# Patient Record
Sex: MALE | Race: WHITE | URBAN - METROPOLITAN AREA
[De-identification: names, ages, dates, MRNs, and addresses within clinical notes are randomized per-mention and may not be internally consistent; named-entity substitution may affect disease eponyms.]

---

## 2017-05-08 ENCOUNTER — OFFICE VISIT (OUTPATIENT)
Dept: FAMILY MEDICINE | Facility: CLINIC | Age: 32
End: 2017-05-08
Payer: COMMERCIAL

## 2017-05-08 VITALS
OXYGEN SATURATION: 98 % | HEART RATE: 69 BPM | SYSTOLIC BLOOD PRESSURE: 128 MMHG | WEIGHT: 171.6 LBS | TEMPERATURE: 98 F | DIASTOLIC BLOOD PRESSURE: 77 MMHG

## 2017-05-08 DIAGNOSIS — S29.019A STRAIN OF THORACIC SPINE, INITIAL ENCOUNTER: Primary | ICD-10-CM

## 2017-05-08 PROCEDURE — 99213 OFFICE O/P EST LOW 20 MIN: CPT | Performed by: FAMILY MEDICINE

## 2017-05-08 RX ORDER — CYCLOBENZAPRINE HCL 10 MG
5-10 TABLET ORAL 3 TIMES DAILY PRN
Qty: 30 TABLET | Refills: 1 | Status: SHIPPED | OUTPATIENT
Start: 2017-05-08

## 2017-05-08 RX ORDER — CETIRIZINE HYDROCHLORIDE 10 MG/1
10 TABLET ORAL DAILY
COMMUNITY

## 2017-05-08 RX ORDER — LIDOCAINE 50 MG/G
PATCH TOPICAL
Qty: 10 PATCH | Refills: 1 | Status: SHIPPED | OUTPATIENT
Start: 2017-05-08 | End: 2017-05-08

## 2017-05-08 NOTE — NURSING NOTE
Chief Complaint   Patient presents with     Back Pain     started this morning, worse with lifing something heavy, difficulty with breathing and moving due to pain, left lower shoulder blade, upper back,       Initial /77  Pulse 69  Temp 98  F (36.7  C) (Oral)  Wt 171 lb 9.6 oz (77.8 kg)  SpO2 98% There is no height or weight on file to calculate BMI.  Medication Reconciliation: complete  Ronnie Rodriguez CMA

## 2017-05-08 NOTE — MR AVS SNAPSHOT
"              After Visit Summary   2017    Hair Vaca    MRN: [id number]           Patient Information     Date Of Birth          1985        Visit Information        Provider Department      2017 3:00 PM Dylan Quintana MD [facility]        Today's Diagnoses     Strain of thoracic spine, initial encounter    -  1       Follow-ups after your visit        Who to contact     If you have questions or need follow up information about today's clinic visit or your schedule please contact [facility] directly at [phone].  Normal or non-critical lab and imaging results will be communicated to you by Olocodehart, letter or phone within 4 business days after the clinic has received the results. If you do not hear from us within 7 days, please contact the clinic through Olocodehart or phone. If you have a critical or abnormal lab result, we will notify you by phone as soon as possible.  Submit refill requests through WhereverTV or call your pharmacy and they will forward the refill request to us. Please allow 3 business days for your refill to be completed.          Additional Information About Your Visit        MyChart Information     WhereverTV lets you send messages to your doctor, view your test results, renew your prescriptions, schedule appointments and more. To sign up, go to [email or web address] . Click on \"Log in\" on the left side of the screen, which will take you to the Welcome page. Then click on \"Sign up Now\" on the right side of the page.     You will be asked to enter the access code listed below, as well as some personal information. Please follow the directions to create your username and password.     Your access code is: [email or web address]  Expires: 2017  3:59 PM     Your access code will  in 90 days. If you need help or a new code, please call your [facility] or [phone].        Care EveryWhere ID     This is your Care EveryWhere ID. This could be " used by other organizations to access your [facility] medical records  [id number]        Your Vitals Were     Pulse Temperature Pulse Oximetry             69 98  F (36.7  C) (Oral) 98%          Blood Pressure from Last 3 Encounters:   05/08/17 128/77    Weight from Last 3 Encounters:   05/08/17 171 lb 9.6 oz (77.8 kg)              Today, you had the following     No orders found for display         Today's Medication Changes          These changes are accurate as of: 5/8/17  3:59 PM.  If you have any questions, ask your nurse or doctor.               Start taking these medicines.        Dose/Directions    cyclobenzaprine 10 MG tablet   Commonly known as:  FLEXERIL   Used for:  Strain of thoracic spine, initial encounter   Started by:  Dylan Quintana MD        Dose:  5-10 mg   Take 0.5-1 tablets (5-10 mg) by mouth 3 times daily as needed for muscle spasms   Quantity:  30 tablet   Refills:  1            Where to get your medicines      These medications were sent to [facility] [address] [facility] [address]  [id number] [place] [id number]     Phone:  [phone]     cyclobenzaprine 10 MG tablet                Primary Care Provider Office Phone # Fax #    Pia Villeda [phone]       [facility] [address]    [facility] [address]        Thank you!     Thank you for choosing [facility]  for your care. Our goal is always to provide you with excellent care. Hearing back from our patients is one way we can continue to improve our services. Please take a few minutes to complete the written survey that you may receive in the mail after your visit with us. Thank you!             Your Updated Medication List - Protect others around you: Learn how to safely use, store and throw away your medicines at www.disposemymeds.org.          This list is accurate as of: 5/8/17  3:59 PM.  Always use your most recent med  list.                   Brand Name Dispense Instructions for use    cetirizine 10 MG tablet    zyrTEC     Take 10 mg by mouth daily       cyclobenzaprine 10 MG tablet    FLEXERIL    30 tablet    Take 0.5-1 tablets (5-10 mg) by mouth 3 times daily as needed for muscle spasms

## 2017-05-08 NOTE — PROGRESS NOTES
SUBJECTIVE:    Hair Vaca is a 31 year old male seen in clinic to day for low back pain.   Has been going on for  today      History of Present Illness:    There is  history of injury.patient was moving furniture and felt a pop in his mid thoracic back   The pain is located on both sides.   Pain Quality: Sharp   The back pain does not radiate.   Measures which improve the pain include sitting.   Measures which worsen the pain include standing   Associated Signs and Symptoms: tingling of hands and feet     There is no history of abdominal pain or bowel or bladder dysfunction.   Past Medical, social, family histories, medications, and allergies reviewed and updated    OBJECTIVE:  EXAM:   Blood pressure 128/77, pulse 69, temperature 98  F (36.7  C), temperature source Oral, weight 171 lb 9.6 oz (77.8 kg), SpO2 98 %.  General appearance - healthy, alert and no distress  Heart: no murmurs,Regular rate and rhythm.   Chest: is clear; no wheezes or rales.  Abd:The abdomen is soft without tenderness, guarding, mass, rebound or organomegaly. Bowel sounds are present  Back: Tenderness mid thoracic area  EXT: Lower extremities 5/5 strength bilaterally  NEURO: Neurovascularly Intact Distally.   DTRs normal and symmetric throughout L.E  Neg straight leg-raise lulu    ICD-10-CM    1. Strain of thoracic spine, initial encounter S29.019A cyclobenzaprine (FLEXERIL) 10 MG tablet     DISCONTINUED: lidocaine (LIDODERM) 5 % Patch    PLAN:follow up at chiropractor

## 2017-05-17 ENCOUNTER — TELEPHONE (OUTPATIENT)
Dept: FAMILY MEDICINE | Facility: CLINIC | Age: 32
End: 2017-05-17

## 2017-05-17 NOTE — TELEPHONE ENCOUNTER
"Patient was seen 5/8/17 with onset low back pain by Dr. Dylan Quintana  He was put on flexeril (a muscle relaxant).  He's been going to chiropractor every day since last Tuesday.  Has had xrays and will be getting an MRI (through the chiropractor)  He states has now reported it to his work and will be work comp.  Pain is better from aspect that he's \"not paralyzed on the ground like a little girl\" but still \"hurts really bad\".  States pain is more so in between his shoulder blades.  Says xray looks like an \"s\" with \"4 gaps in between his vertebrae.  He had an adjustment with chiropractor this morning and the pain is persistently getting worse.  He states no bowel or bladder issues.  No foot drop.  Advised will need to be seen; ? If his work had instructed him to see their occupational health clinician.  He states had not.  Says would normally just see his regular doctor who's at [facility] but had come to us because we were closer and he was in a lot of pain.  I advised that he contact his [facility] clinic and schedule an appointment with them.  [facility] does have occupational health services as well.  He is questioning if should just go to ED if can't get pain med over the phone.  I told him that if pain wasn't significant and no other concerns should schedule clinic appointment if possible.  He states his pain level is getting persistently worse over course of day and fearful he will end up like he was when came to our clinic.  He states is going to the ED with his back pain.  Was made aware will not prescribe narcotic pain relievers over phone; needs to be seen.  Lucy Patel RN    "

## 2019-05-13 ENCOUNTER — OFFICE VISIT (OUTPATIENT)
Dept: URGENT CARE | Facility: URGENT CARE | Age: 34
End: 2019-05-13
Payer: COMMERCIAL

## 2019-05-13 VITALS
SYSTOLIC BLOOD PRESSURE: 137 MMHG | HEART RATE: 61 BPM | WEIGHT: 200 LBS | DIASTOLIC BLOOD PRESSURE: 87 MMHG | TEMPERATURE: 98.8 F | OXYGEN SATURATION: 96 %

## 2019-05-13 DIAGNOSIS — R07.0 THROAT PAIN: Primary | ICD-10-CM

## 2019-05-13 LAB
DEPRECATED S PYO AG THROAT QL EIA: NORMAL
SPECIMEN SOURCE: NORMAL

## 2019-05-13 PROCEDURE — 87880 STREP A ASSAY W/OPTIC: CPT | Performed by: FAMILY MEDICINE

## 2019-05-13 PROCEDURE — 99213 OFFICE O/P EST LOW 20 MIN: CPT | Performed by: FAMILY MEDICINE

## 2019-05-13 PROCEDURE — 87081 CULTURE SCREEN ONLY: CPT | Performed by: FAMILY MEDICINE

## 2019-05-13 NOTE — LETTER
May 15, 2019    Hair Vaca  [address] [facility] [address]            Dear Hair,    The results of your recent tests were normal.  Below is a copy of the results.  It was a pleasure to see you at your last appointment.    If you have any questions or concerns, please call myself or my nurse at [phone].    Sincerely,    Guzman Ramirez MD / [email or web address]    Results for orders placed or performed in visit on 05/13/19   Strep, Rapid Screen   Result Value Ref Range    Specimen Description Throat     Rapid Strep A Screen       NEGATIVE: No Group A streptococcal antigen detected by immunoassay, await culture report.   Beta strep group A culture   Result Value Ref Range    Specimen Description Throat     Culture Micro No beta hemolytic Streptococcus Group A isolated

## 2019-05-14 LAB
BACTERIA SPEC CULT: NORMAL
SPECIMEN SOURCE: NORMAL

## 2019-05-14 NOTE — PROGRESS NOTES
Chief complaint: sore throat    Sore throat for a week  Not sure if it was from his allergies initially    However daughter was in school this morning - was brought in to clinic strep was negative and was told either mono/tonsillitis.      fever  - No  cough  -slight cough this morning  ill contacts - Yes  able to swallow liquids and solids -YES  other symptoms above  Rash: No  Has tried over the counter medications no relief  because of persistence, patient came in to be seen.    ROS:  denies any exertional chest pain or shortness of breath  denies any unusual rash or joint swelling  denies post-tussive emesis or pertussis like symptoms  Negative for constitutional, eye, ear, nose, throat, skin, respiratory, cardiac, and gastrointestinal other than those outlined in the HPI.    PMH: chart reviewed  FH: chart reviewed    SH: chart reviewed and as above   Physical Exam:   /87   Pulse 61   Temp 98.8  F (37.1  C) (Oral)   Wt 90.7 kg (200 lb)   SpO2 96%   General : Awake Alert not in any acute cardiorespiratory distress  Head:       Normocephalic Atraumatic  Eyes:    Pupils equally reactive to light and accomodation. Sclera not icteric.   ENT:   midline nasal septum, mild nasal congestion, sinuses non-tender  left ear: no tragal tenderness, no mastoid tenderness, normal EAC, normal TM  right ear: left ear: no tragal tenderness, no mastoid tenderness, normal EAC, normal TM  mouth moist buccal mucosa, No hyperemic posterior pharyngeal wall, no trismus  tonsils: tonsils are present and normal  anterior cervical nodes: No tender  posterior cervical nodes: No  palpable  Heart:  Regular in rate and rhythm, no murmurs rubs or gallops  Lungs: Symmetrical Chest Expansion, no retractions, clear breath sounds  Abdomen: soft, no hepatosplenomegally  Psych: Appropriate mood and affect. Pleasant  Skin: patient undressed to level of his/her comfort. No visible concerning lesions.    Labs: Strep negative     ICD-10-CM    1.  Throat pain R07.0 Strep, Rapid Screen     Suspect viral at this time.   supportive treatment: advised supportive treatment, Advised to come back in if with any worsening symptoms or if not better despite supportive measures. Especially if with any worsening sore throat, inability to eat or drink or swallow, or trismus. Symptoms of peritonsillar abscess discussed. Patient voiced understanding.  Discussed mono testing and patient declined aware of risks of mono, may break out in a rash if on amoxicillin, and risk of hepatosplenomegaly and rupture with mono. Signs and symptoms of mono discussed  adverse reactions of medication discussed  OTC medications discussed  advised to come back in right away if with any worsening symptoms or if with no relief despite treatment plan  patient voiced understanding and had no further questions at this time.      Ashley Hinds M.D.

## 2023-05-01 ENCOUNTER — TRANSFERRED RECORDS (OUTPATIENT)
Dept: HEALTH INFORMATION MANAGEMENT | Facility: CLINIC | Age: 38
End: 2023-05-01